# Patient Record
Sex: FEMALE | Race: WHITE | Employment: UNEMPLOYED | URBAN - METROPOLITAN AREA
[De-identification: names, ages, dates, MRNs, and addresses within clinical notes are randomized per-mention and may not be internally consistent; named-entity substitution may affect disease eponyms.]

---

## 2021-10-31 ENCOUNTER — HOSPITAL ENCOUNTER (EMERGENCY)
Facility: CLINIC | Age: 66
Discharge: HOME | End: 2021-10-31
Attending: EMERGENCY MEDICINE | Admitting: EMERGENCY MEDICINE
Payer: COMMERCIAL

## 2021-10-31 VITALS
DIASTOLIC BLOOD PRESSURE: 81 MMHG | OXYGEN SATURATION: 99 % | RESPIRATION RATE: 24 BRPM | HEIGHT: 64 IN | BODY MASS INDEX: 27.14 KG/M2 | SYSTOLIC BLOOD PRESSURE: 139 MMHG | TEMPERATURE: 96.5 F | HEART RATE: 77 BPM | WEIGHT: 159 LBS

## 2021-10-31 DIAGNOSIS — R51.9 NONINTRACTABLE HEADACHE, UNSPECIFIED CHRONICITY PATTERN, UNSPECIFIED HEADACHE TYPE: ICD-10-CM

## 2021-10-31 DIAGNOSIS — F43.0 ACUTE REACTION TO STRESS: ICD-10-CM

## 2021-10-31 PROCEDURE — 250N000013 HC RX MED GY IP 250 OP 250 PS 637: Performed by: EMERGENCY MEDICINE

## 2021-10-31 PROCEDURE — 99283 EMERGENCY DEPT VISIT LOW MDM: CPT

## 2021-10-31 RX ORDER — HYDROXYZINE HYDROCHLORIDE 25 MG/1
25 TABLET, FILM COATED ORAL ONCE
Status: COMPLETED | OUTPATIENT
Start: 2021-10-31 | End: 2021-10-31

## 2021-10-31 RX ORDER — ACETAMINOPHEN 500 MG
1000 TABLET ORAL ONCE
Status: COMPLETED | OUTPATIENT
Start: 2021-10-31 | End: 2021-10-31

## 2021-10-31 RX ADMIN — HYDROXYZINE HYDROCHLORIDE 25 MG: 25 TABLET ORAL at 20:49

## 2021-10-31 RX ADMIN — ACETAMINOPHEN 1000 MG: 500 TABLET, FILM COATED ORAL at 21:37

## 2021-10-31 ASSESSMENT — ENCOUNTER SYMPTOMS
NERVOUS/ANXIOUS: 1
TREMORS: 1
HEADACHES: 1
SHORTNESS OF BREATH: 0

## 2021-10-31 ASSESSMENT — MIFFLIN-ST. JEOR: SCORE: 1246.22

## 2021-11-01 NOTE — ED TRIAGE NOTES
"[facility]  ED Arrival Note    Arrived to ED via [facility]. A &O X4. ABC's intact. Reportedly pt was in a family members car and received bad news. Pt became very anxious and went into a panic attack. Family called 911. Upon EMS arrival pt c/o headache and \"heart\".  On arrival pt is shaky and very tearful but able to answer simple questions.    Visitors during triage: None    Triage Interventions: None    Ambulatory: Yes    Meets Stroke Criteria?: No    Meets Trauma Criteria?: No    Directed to: MH/BH    "

## 2021-11-01 NOTE — ED NOTES
Bed: ED16  Expected date:   Expected time:   Means of arrival:   Comments:  437  66F Depression  2017

## 2021-11-01 NOTE — ED PROVIDER NOTES
"  History   Chief Complaint:  Psychiatric Evaluation     The history is provided by the patient and the EMS personnel.      Celeste Cristina is a 66 year old female who presents for psychiatric evaluation. The patient reports that she was driving just prior to arrival when she received a call that her nephew may pass away soon due to COVID. This caused her to become panicked with a headache and tremors, so she pulled over and called EMS. She has had similar headaches in the past but denies any history of panic attacks or anxiety attacks. She denies chest pain or shortness of breath. She denies alcohol or drug use and has no suicidal or homicidal ideations.     Review of Systems   Respiratory: Negative for shortness of breath.    Cardiovascular: Negative for chest pain.   Neurological: Positive for tremors and headaches.   Psychiatric/Behavioral: The patient is nervous/anxious.    All other systems reviewed and are negative.      Allergies:  No Known Allergies    Medications:  Lexapro  Trazodone    Past Medical History:     The patient denies past medical history.      Past Surgical History:    The patient denies past surgical history.     Family History:    Noncontributory    Social History:  Presents to ED alone via EMS.     Physical Exam     Patient Vitals for the past 24 hrs:   BP Temp Temp src Pulse Resp SpO2 Height Weight   10/31/21 2030 -- -- -- 88 24 -- -- --   10/31/21 2021 (!) 158/98 (!) 96.5  F (35.8  C) Temporal -- -- 94 % 1.626 m (5' 4\") 72.1 kg (159 lb)       Physical Exam  General: Alert and cooperative with exam. Patient in mild distress. Normal mentation. Very anxious in appearance.  Head:  Scalp is NC/AT  Eyes:  No scleral icterus, PERRL  ENT:  The external nose and ears are normal.  Neck:  Normal range of motion without rigidity.  CV:  Regular rate and rhythm    No pathologic murmur   Resp:  Breath sounds are clear bilaterally    Non-labored, no retractions or accessory muscle use  GI:  Abdomen " is soft, no distension, no tenderness. No peritoneal signs  MS:  No lower extremity edema   Skin:  Warm and dry, No rash or lesions noted.  Neuro: Oriented x 3. No gross motor deficits. Tremulous to all extremities (distractible).     Emergency Department Course     Emergency Department Course:  Reviewed:  I reviewed nursing notes, vitals and past medical history.    Assessments:  2023 I obtained history and examined the patient as noted above.     2131 I rechecked the patient and explained findings.     Interventions:  2049 Atarax 25 mg, PO  2137 Tylenol 1000 mg, PO    Disposition:  The patient was discharged to home.     Impression & Plan     Medical Decision Making:  Celeste Cristina is a 66 year old female who presents for evaluation of panic attack in reaction to stress. Patient feels improved after interventions as noted above in the Emergency Department.  There are no signs at this point of a general medical problem causing anxiety (PE, hyperthyroidism, other metabolic derangement, infection, etc).  There are no signs of serious decompensation warranting psychiatric hospitalization or 72 hold (no suicidal or homicidal ideation, no danger to self). Supportive outpatient management is therefore indicated.      Diagnosis:    ICD-10-CM    1. Acute reaction to stress  F43.0    2. Nonintractable headache, unspecified chronicity pattern, unspecified headache type  R51.9      Scribe Disclosure:  I, Caitlin Garcia, am serving as a scribe at 8:28 PM on 10/31/2021 to document services personally performed by Abdon Patino DO based on my observations and the provider's statements to me.     Abdon Patino DO  11/02/21 1344

## 2023-02-23 ENCOUNTER — TELEPHONE (OUTPATIENT)
Dept: PULMONOLOGY | Facility: CLINIC | Age: 68
End: 2023-02-23
Payer: COMMERCIAL

## 2023-02-23 DIAGNOSIS — R05.9 COUGH: Primary | ICD-10-CM

## 2023-02-23 NOTE — TELEPHONE ENCOUNTER
Pt left message requesting a new pt appt with Dr Guaman, who sees her brother.  Provided scheduling number.

## 2023-02-25 ENCOUNTER — TELEPHONE (OUTPATIENT)
Dept: PULMONOLOGY | Facility: CLINIC | Age: 68
End: 2023-02-25
Payer: COMMERCIAL

## 2023-02-25 NOTE — TELEPHONE ENCOUNTER
lvm via  line for the patient to call back and schedule the following:    Appointment type: cxr  Provider: Deniz  Return date: 4/24/23  Specialty phone number: [phone]  Additional appointment(s) needed: n/a  Additonal Notes: called pt twice first call pt answered and disconnected while  was speaking, next call no answer  lvm to to scheduled cxr via call center

## 2023-03-09 NOTE — TELEPHONE ENCOUNTER
RECORDS RECEIVED FROM: internal    DATE RECEIVED: 4.24.23    NOTES STATUS DETAILS   OFFICE NOTE from referring provider internal  Self referred    OFFICE NOTE from other specialist     DISCHARGE SUMMARY from hospital     DISCHARGE REPORT from the ER     MEDICATION LIST internal     IMAGING  (NEED IMAGES AND REPORTS)     CT SCAN     CHEST XRAY (CXR) In process  Order placed- needing to be scheduled    TESTS     PULMONARY FUNCTION TESTING (PFT) internal  Scheduled 4.19.23        Action 3.9.23 sv    Action Taken Message sent to CC pool to help schedule CXR

## 2023-03-10 ENCOUNTER — TELEPHONE (OUTPATIENT)
Dept: PULMONOLOGY | Facility: CLINIC | Age: 68
End: 2023-03-10
Payer: COMMERCIAL

## 2023-03-10 NOTE — TELEPHONE ENCOUNTER
Called pt (1st attempt) for the patient to call back and schedule the following:    Appointment type: CXR  Provider: RADHA  Return date: 4/24/23  Specialty phone number: [phone]  Additional appointment(s) needed: NA  Additonal Notes: called patient via [employer] . Patient's phone didn't ever go to .

## 2023-03-17 NOTE — TELEPHONE ENCOUNTER
Left Voicemail (2nd Attempt) for the patient to call back and schedule the following:    Appointment type: CXR  Provider: RADHA  Return date: 4/24/23  Specialty phone number: [phone]  Additional appointment(s) needed: NA  Additonal Notes: LVM via [facility] .

## 2023-03-29 ENCOUNTER — TELEPHONE (OUTPATIENT)
Dept: PULMONOLOGY | Facility: CLINIC | Age: 68
End: 2023-03-29
Payer: COMMERCIAL

## 2023-03-29 NOTE — TELEPHONE ENCOUNTER
Patient Contacted for the patient to call back and schedule the following:    Appointment type: CXR  Provider: Deniz  Return date: 4/24/23  Specialty phone number: [phone]  Additional appointment(s) needed: na  Additonal Notes: 3/29/23 - Scheduled CXR, prior to visit with provider on 4/24/23. Will send printed itinerary. [facility]

## 2023-04-03 ENCOUNTER — TELEPHONE (OUTPATIENT)
Dept: PULMONOLOGY | Facility: CLINIC | Age: 68
End: 2023-04-03
Payer: COMMERCIAL

## 2023-04-03 NOTE — TELEPHONE ENCOUNTER
Pt states genetic pre disposition to breast ca.  She is concerned about radiation associated with CXR and is requesting an MRI.  Writer explained that the amount of radiation received is very low and that an MRI would likely not be covered without a qualifying dx, however, pt would like MD opinion.

## 2023-04-03 NOTE — TELEPHONE ENCOUNTER
[facility] Call Center    Phone Message    May a detailed message be left on voicemail: yes     Reason for Call: Other: Per pt would like to know if she can have a MRI instead of a CT CHEST Xray due to radiation. Please call pt back to discuss. Thank you!     Action Taken: Message routed to:  [facility] PULM    Travel Screening: Not Applicable

## 2023-04-04 NOTE — TELEPHONE ENCOUNTER
I agree with what you said. C xray is minimal radiation. Mri is no indicated at this time.     Thanks,   Hem     Pt verbalized understanding and agreed to proceed with CXR prior to appt.  Pt asked that we remove need for  from her chart.

## 2023-04-12 DIAGNOSIS — R05.9 COUGH: ICD-10-CM

## 2023-04-12 PROCEDURE — 94726 PLETHYSMOGRAPHY LUNG VOLUMES: CPT | Performed by: INTERNAL MEDICINE

## 2023-04-12 PROCEDURE — 94729 DIFFUSING CAPACITY: CPT | Performed by: INTERNAL MEDICINE

## 2023-04-12 PROCEDURE — 94375 RESPIRATORY FLOW VOLUME LOOP: CPT | Performed by: INTERNAL MEDICINE

## 2023-04-14 LAB
DLCOUNC-%PRED-PRE: 90 %
DLCOUNC-PRE: 17.89 ML/MIN/MMHG
DLCOUNC-PRED: 19.68 ML/MIN/MMHG
ERV-%PRED-PRE: 74 %
ERV-PRE: 0.47 L
ERV-PRED: 0.63 L
EXPTIME-PRE: 8.05 SEC
FEF2575-%PRED-PRE: 112 %
FEF2575-PRE: 2.12 L/SEC
FEF2575-PRED: 1.88 L/SEC
FEFMAX-%PRED-PRE: 115 %
FEFMAX-PRE: 6.8 L/SEC
FEFMAX-PRED: 5.86 L/SEC
FEV1-%PRED-PRE: 92 %
FEV1-PRE: 1.98 L
FEV1FEV6-PRE: 83 %
FEV1FEV6-PRED: 80 %
FEV1FVC-PRE: 82 %
FEV1FVC-PRED: 79 %
FEV1SVC-PRE: 79 %
FEV1SVC-PRED: 68 %
FIFMAX-PRE: 5.21 L/SEC
FRCPLETH-%PRED-PRE: 97 %
FRCPLETH-PRE: 2.65 L
FRCPLETH-PRED: 2.71 L
FVC-%PRED-PRE: 88 %
FVC-PRE: 2.4 L
FVC-PRED: 2.71 L
IC-%PRED-PRE: 81 %
IC-PRE: 2.04 L
IC-PRED: 2.5 L
RVPLETH-%PRED-PRE: 108 %
RVPLETH-PRE: 2.18 L
RVPLETH-PRED: 2.01 L
TLCPLETH-%PRED-PRE: 94 %
TLCPLETH-PRE: 4.68 L
TLCPLETH-PRED: 4.94 L
VA-%PRED-PRE: 88 %
VA-PRE: 4.2 L
VC-%PRED-PRE: 80 %
VC-PRE: 2.51 L
VC-PRED: 3.12 L

## 2023-04-24 ENCOUNTER — ANCILLARY PROCEDURE (OUTPATIENT)
Dept: GENERAL RADIOLOGY | Facility: CLINIC | Age: 68
End: 2023-04-24
Payer: COMMERCIAL

## 2023-04-24 ENCOUNTER — PRE VISIT (OUTPATIENT)
Dept: PULMONOLOGY | Facility: CLINIC | Age: 68
End: 2023-04-24
Payer: COMMERCIAL

## 2023-04-24 ENCOUNTER — OFFICE VISIT (OUTPATIENT)
Dept: PULMONOLOGY | Facility: CLINIC | Age: 68
End: 2023-04-24
Payer: COMMERCIAL

## 2023-04-24 VITALS
HEIGHT: 64 IN | BODY MASS INDEX: 26.46 KG/M2 | HEART RATE: 71 BPM | WEIGHT: 155 LBS | SYSTOLIC BLOOD PRESSURE: 121 MMHG | RESPIRATION RATE: 17 BRPM | DIASTOLIC BLOOD PRESSURE: 74 MMHG | OXYGEN SATURATION: 98 %

## 2023-04-24 DIAGNOSIS — R91.8 PULMONARY NODULES: Primary | ICD-10-CM

## 2023-04-24 DIAGNOSIS — R05.9 COUGH: ICD-10-CM

## 2023-04-24 PROCEDURE — G0463 HOSPITAL OUTPT CLINIC VISIT: HCPCS

## 2023-04-24 PROCEDURE — 99204 OFFICE O/P NEW MOD 45 MIN: CPT

## 2023-04-24 PROCEDURE — 71046 X-RAY EXAM CHEST 2 VIEWS: CPT | Performed by: RADIOLOGY

## 2023-04-24 ASSESSMENT — PAIN SCALES - GENERAL: PAINLEVEL: NO PAIN (0)

## 2023-04-24 NOTE — PATIENT INSTRUCTIONS
"On today's Chest X ray, there are 2 nodules that are noted.  One in the Right lower lung (a nodule of calcium was present in this area on a CT scan back in 2020) and one in the left upper lung.  The one in the left upper lung is 11 mm in diameter.     Nodule is a non-specific term and can represent a lot of different abnormalities in the lung.  It could represent something like a localized bit of scarring (which is not concerning), but it could also represent other types of lung disease such as cancer.      A chest CT scan is the best test we have to look at these size of nodules.      I am going to start by trying to get old Chest X ray images that were done in years past and compare them to today's Chest X ray.  If both of the nodules were present years ago, then the nodules would be considered \"benign\" and no further work up is necessary.  If the nodules are new, then we will need to likely get a chest CT scan to look at them more carefully.      I will call you after I review the old images.   "

## 2023-04-24 NOTE — LETTER
4/24/2023         RE: Mary Cristina  [address]  [place] [address]        Dear Colleague,    Thank you for referring your patient, Mary Crsitina, to the [facility] [place]. Please see a copy of my visit note below.    [facility]  Pulmonary Medicine  Visit Clinic Note  April 24, 2023         ASSESSMENT & PLAN       Pulmonary nodules    She made this appointment today because she had an abnormal imaging finding noted on an abdominal CT scan back in 2020.  This is a right lower lobe calcified nodule.  A chest x-ray today however shows both a right lower lobe nodule as well as a left upper lobe nodule.  Left upper lung lobe nodule is about 1 cm in diameter, but it is oblong.  I suggested that she get a chest CT scan to better characterize this, however she is extremely hesitant to move forward with this.  She has been diagnosed with a gene mutation, which elevates her risk for breast cancer.  Therefore she is trying to avoid any radiation to her breast tissue.  Because of this, I will try to get her old images to review.  She has a chest x-ray in 2016 as well as 1 in 2019.  Reports do not comment on the left upper lobe nodule, however I will review them myself to see if there is any chance that this nodule was present back then.  If so, there is no need for further imaging and these nodules are likely benign.  If in fact this left upper lobe nodule is new, then I will recommend that she get a chest CT scan to characterize this.        I will call her after I get the results of the chest x-ray images uploaded into PACS for my review.      Chacorta Guaman MD     I spent 45 minutes on the date of the encounter doing chart review, history and exam, documentation and discussion of pulmonary nodules.    Addendum:  CXR from 2016 and 2019 obtained.  LEILA nodule present then.  No need for further imaging.  Benign nodules, likely from  "previous infection.         Today's visit note:     Chief Complaint: Consult (New Cough)      HISTORY OF PRESENT ILLNESS:    Mary Cristina is a 67 year old year old female who is being seen for              Past Medical and Surgical History:     Past Medical History:   Diagnosis Date    Depression     Gastroesophageal reflux disease without esophagitis     High cholesterol     Osteopenia      History reviewed. No pertinent surgical history.        Family History:     Family History   Problem Relation Age of Onset    Pulmonary fibrosis Brother               Social History:     Social History     Socioeconomic History    Marital status:      Spouse name: Not on file    Number of children: Not on file    Years of education: Not on file    Highest education level: Not on file   Occupational History    Not on file   Tobacco Use    Smoking status: Never    Smokeless tobacco: Never   Vaping Use    Vaping status: Not on file   Substance and Sexual Activity    Alcohol use: Never    Drug use: Never    Sexual activity: Not on file   Other Topics Concern    Not on file   Social History Narrative    Not on file     Social Determinants of Health     Financial Resource Strain: Not on file   Food Insecurity: Not on file   Transportation Needs: Not on file   Physical Activity: Not on file   Stress: Not on file   Social Connections: Not on file   Intimate Partner Violence: Not on file   Housing Stability: Not on file            Medications:     No current outpatient medications on file.     No current facility-administered medications for this visit.            Review of Systems:       A complete review of systems was otherwise negative except as noted in the HPI.      PHYSICAL EXAM:  /74   Pulse 71   Resp 17   Ht 1.626 m (5' 4\")   Wt 70.3 kg (155 lb)   SpO2 98%   BMI 26.61 kg/m       General: Comfortable, No apparent distress  Eyes: Anicteric  Ears: Hearing grossly normal  Mouth: Oral mucosa is moist, without " any lesions. No oropharyngeal exudate.  Lymphatics: No cervical or supraclavicular nodes  Respiratory: Good air movement. No crackles. No rhonchi. No wheezes  Cardiac: RRR, normal S1, S2. No murmurs. No JVD  Musculoskeletal: Extremities normal. No clubbing. No cyanosis. No edema.  Skin: No rash on limited exam  Neuro: Normal mentation. Normal speech.  Psych:Normal affect           Data:   All laboratory and imaging data reviewed.      PFT:         PFT Interpretation:  No airflow obstruction.  Normal lung volume. Normal diffusion capacity  Valid Maneuver    CXR: I have reviewed the CXR images from April 24, 2023 and agree with the radiologist interpretation below:  FINDINGS: PA and lateral radiographs of the chest. Trachea is midline.  The cardiac silhouette is not enlarged. No visualized pleural effusion  or pneumothorax. Lung apices are partially obscured by chin and  patient positioning. 11 mm nodule in the anterior left upper lobe.  Additional 4 mm nodule in the lateral right lower lung. Probable small  calcified mediastinal lymph nodes. Visualized upper abdomen is  unremarkable.                                                                      IMPRESSION: 11 mm nodularity in the anterior left upper lobe and 4 mm  nodule in the lateral right lower lung, probably sequela of prior  granulomatous disease, though recommend further evaluation with chest  CT.    I reviewed the chest x-ray reports from 2016, 2019, and the lung window reports from an abdominal CT scan in 2020.  The abdominal CT scan describes a right lower lobe calcified nodule, which could be the right lower lobe nodule noted on the chest x-ray today.  There is no description of the left upper lobe nodule on previous imaging.      No results found for this or any previous visit (from the past 168 hour(s)).                                          Again, thank you for allowing me to participate in the care of your patient.         Sincerely,        Pa Guaman MD

## 2023-04-24 NOTE — PROGRESS NOTES
[facility]  Pulmonary Medicine  Visit Clinic Note  April 24, 2023         ASSESSMENT & PLAN       Pulmonary nodules    She made this appointment today because she had an abnormal imaging finding noted on an abdominal CT scan back in 2020.  This is a right lower lobe calcified nodule.  A chest x-ray today however shows both a right lower lobe nodule as well as a left upper lobe nodule.  Left upper lung lobe nodule is about 1 cm in diameter, but it is oblong.  I suggested that she get a chest CT scan to better characterize this, however she is extremely hesitant to move forward with this.  She has been diagnosed with a gene mutation, which elevates her risk for breast cancer.  Therefore she is trying to avoid any radiation to her breast tissue.  Because of this, I will try to get her old images to review.  She has a chest x-ray in 2016 as well as 1 in 2019.  Reports do not comment on the left upper lobe nodule, however I will review them myself to see if there is any chance that this nodule was present back then.  If so, there is no need for further imaging and these nodules are likely benign.  If in fact this left upper lobe nodule is new, then I will recommend that she get a chest CT scan to characterize this.        I will call her after I get the results of the chest x-ray images uploaded into PACS for my review.      Chacorta Guaman MD     I spent 45 minutes on the date of the encounter doing chart review, history and exam, documentation and discussion of pulmonary nodules.    Addendum:  CXR from 2016 and 2019 obtained.  LEILA nodule present then.  No need for further imaging.  Benign nodules, likely from previous infection.         Today's visit note:     Chief Complaint: Consult (New Cough)      HISTORY OF PRESENT ILLNESS:    Mary Cristina is a 67 year old year old female who is being seen for              Past Medical and Surgical History:     Past Medical History:   Diagnosis Date      "Depression      Gastroesophageal reflux disease without esophagitis      High cholesterol      Osteopenia      History reviewed. No pertinent surgical history.        Family History:     Family History   Problem Relation Age of Onset     Pulmonary fibrosis Brother               Social History:     Social History     Socioeconomic History     Marital status:      Spouse name: Not on file     Number of children: Not on file     Years of education: Not on file     Highest education level: Not on file   Occupational History     Not on file   Tobacco Use     Smoking status: Never     Smokeless tobacco: Never   Vaping Use     Vaping status: Not on file   Substance and Sexual Activity     Alcohol use: Never     Drug use: Never     Sexual activity: Not on file   Other Topics Concern     Not on file   Social History Narrative     Not on file     Social Determinants of Health     Financial Resource Strain: Not on file   Food Insecurity: Not on file   Transportation Needs: Not on file   Physical Activity: Not on file   Stress: Not on file   Social Connections: Not on file   Intimate Partner Violence: Not on file   Housing Stability: Not on file            Medications:     No current outpatient medications on file.     No current facility-administered medications for this visit.            Review of Systems:       A complete review of systems was otherwise negative except as noted in the HPI.      PHYSICAL EXAM:  /74   Pulse 71   Resp 17   Ht 1.626 m (5' 4\")   Wt 70.3 kg (155 lb)   SpO2 98%   BMI 26.61 kg/m       General: Comfortable, No apparent distress  Eyes: Anicteric  Ears: Hearing grossly normal  Mouth: Oral mucosa is moist, without any lesions. No oropharyngeal exudate.  Lymphatics: No cervical or supraclavicular nodes  Respiratory: Good air movement. No crackles. No rhonchi. No wheezes  Cardiac: RRR, normal S1, S2. No murmurs. No JVD  Musculoskeletal: Extremities normal. No clubbing. No cyanosis. No " edema.  Skin: No rash on limited exam  Neuro: Normal mentation. Normal speech.  Psych:Normal affect           Data:   All laboratory and imaging data reviewed.      PFT:         PFT Interpretation:  No airflow obstruction.  Normal lung volume. Normal diffusion capacity  Valid Maneuver    CXR: I have reviewed the CXR images from April 24, 2023 and agree with the radiologist interpretation below:  FINDINGS: PA and lateral radiographs of the chest. Trachea is midline.  The cardiac silhouette is not enlarged. No visualized pleural effusion  or pneumothorax. Lung apices are partially obscured by chin and  patient positioning. 11 mm nodule in the anterior left upper lobe.  Additional 4 mm nodule in the lateral right lower lung. Probable small  calcified mediastinal lymph nodes. Visualized upper abdomen is  unremarkable.                                                                      IMPRESSION: 11 mm nodularity in the anterior left upper lobe and 4 mm  nodule in the lateral right lower lung, probably sequela of prior  granulomatous disease, though recommend further evaluation with chest  CT.    I reviewed the chest x-ray reports from 2016, 2019, and the lung window reports from an abdominal CT scan in 2020.  The abdominal CT scan describes a right lower lobe calcified nodule, which could be the right lower lobe nodule noted on the chest x-ray today.  There is no description of the left upper lobe nodule on previous imaging.      No results found for this or any previous visit (from the past 168 hour(s)).

## 2025-06-02 ENCOUNTER — TELEPHONE (OUTPATIENT)
Dept: OPHTHALMOLOGY | Facility: CLINIC | Age: 70
End: 2025-06-02
Payer: COMMERCIAL

## 2025-06-02 NOTE — TELEPHONE ENCOUNTER
[facility] Call Center    Phone Message    May a detailed message be left on voicemail: yes     Reason for Call: Other: Patient called stating that the pt had spoken with Dr. Rollins a week ago, and needs an appointment for Macular Degeneration before a laser appointment that is at the [facility] on 6/20/2025. The patient did not want to take the first available that is 7/8/2025 with Dr. Rollins as stated that would be too late. Please call patient back. Thank you.      Action Taken: Other: [facility] Ophthalmology    Travel Screening: Not Applicable     Date of Service:

## 2025-06-02 NOTE — TELEPHONE ENCOUNTER
Pt called back and LVM that she wishes to cancel the appt with Dr. Vargas Schaefer and wait to be seen with Dr. CASTELLANOS. I called her back and she was already scheduled with Dr. CASTELLANOS for 7/8. She was placed on the wait list in case of a cancellation with Dr. CASTELLANOS.    Melissa Fuentes, COA 2:48 PM June 2, 2025

## 2025-06-02 NOTE — TELEPHONE ENCOUNTER
Spoke with pt today regarding a possible appt prior to her [facility] laser procedure on 6/20. When I spoke with her today, I inquired what type of laser she is having done. She states she is set for a PDT laser.    I let her know that Dr. CASTELLANOS's schedule is booked out with the first available on 7/8. I let the pt know that we could schedule with another provider here in the clinic on a day when Dr. CASTELLANOS is also in clinic. The other option would be for pt to possibly be seen by Dr. CASTELLANOS in [place] ([facility]) where he is on Wednesday's or schedule with him on 7/8 and place her on the wait list.    Pt opted to schedule next Monday with Dr. Vargas Schaefer at 8:00 AM.    RADHA Calderon 1:11 PM June 2, 2025

## 2025-06-09 ENCOUNTER — TELEPHONE (OUTPATIENT)
Dept: PULMONOLOGY | Facility: CLINIC | Age: 70
End: 2025-06-09
Payer: COMMERCIAL

## 2025-06-09 NOTE — TELEPHONE ENCOUNTER
Patient LVM on CF line requesting to schedule appointment with Dr. Smith. Patient states she does not need an . Routed to scheduling team.

## 2025-06-09 NOTE — TELEPHONE ENCOUNTER
Left Voicemail (1st Attempt) for the patient to call back and schedule the following:    Appointment type: RTN  Provider: RADHA  Return date: NEXT AVAIL  Specialty phone number: [phone]  Additional appointment(s) needed: NA  Additonal Notes: NA

## 2025-06-09 NOTE — TELEPHONE ENCOUNTER
Pt called wanting to make an appt with Dr. Guaman.     Adelia CASTELLANOS, RN  Pulmonary Nurse Care Coordinator

## 2025-06-10 ENCOUNTER — TELEPHONE (OUTPATIENT)
Dept: PULMONOLOGY | Facility: CLINIC | Age: 70
End: 2025-06-10
Payer: COMMERCIAL

## 2025-06-10 NOTE — TELEPHONE ENCOUNTER
"[facility] Call Center    Phone Message    May a detailed message be left on voicemail: yes     Reason for Call: Pt recently had chest x-ray done 6/5/25 through [facility] and is concerned about the portion that indicates \"Interstitial lung opacities have increased in size which may indicate progression of fibrotic change\". Her provider has since ordered a chest CT for her, which she is scheduled to have done on 6/12/25.    Pt is currently scheduled to meet with Dr. Guaman 7/21/25, but is extremely anxious about these findings and wondering if he could get her in for an earlier appt? Either way, she would appreciate it if he could review her chest x-ray and then her CT when it becomes available.     Action Taken: Other: Pulm    Travel Screening: Not Applicable   "

## 2025-06-10 NOTE — TELEPHONE ENCOUNTER
Patient Contacted for the patient to call back and schedule the following:    Appointment type: RTN  Provider: RADHA  Return date: NEXT AVAIL  Specialty phone number: [phone]  Additional appointment(s) needed: NA  Additonal Notes: NA

## 2025-06-13 NOTE — TELEPHONE ENCOUNTER
RN called and spoke with the pt. RN let her know that we are working on getting both the cxr and CT in our system and then will send Dr. Guaman a message. Pt is worried about CXR findings. Pt reports breathing is doing good. She does sometimes (once or twice a day) gets pain in the back of the lungs but then it goes away.     Adelia CASTELLANOS RN  Pulmonary Nurse Care Coordinator

## 2025-06-13 NOTE — TELEPHONE ENCOUNTER
Patent call back for update on results info and getting a sooner appointment. Please advise. Thank you

## 2025-06-16 NOTE — TELEPHONE ENCOUNTER
RN called to relay that Dr. Guaman would like to discuss this at her appt. Pt is worried since one of her brothers passed away due to fibrosis of the lungs. RN reassured that there is nothing to do at this time.    Adelia CASTELLANOS RN  Pulmonary Nurse Care Coordinator

## 2025-07-01 DIAGNOSIS — H35.30 ARMD (AGE-RELATED MACULAR DEGENERATION), BILATERAL: Primary | ICD-10-CM

## 2025-07-21 ENCOUNTER — LAB (OUTPATIENT)
Dept: LAB | Facility: CLINIC | Age: 70
End: 2025-07-21
Payer: COMMERCIAL

## 2025-07-21 ENCOUNTER — OFFICE VISIT (OUTPATIENT)
Dept: PULMONOLOGY | Facility: CLINIC | Age: 70
End: 2025-07-21
Payer: COMMERCIAL

## 2025-07-21 VITALS
OXYGEN SATURATION: 98 % | WEIGHT: 164 LBS | SYSTOLIC BLOOD PRESSURE: 111 MMHG | HEART RATE: 61 BPM | DIASTOLIC BLOOD PRESSURE: 68 MMHG | HEIGHT: 64 IN | BODY MASS INDEX: 28 KG/M2

## 2025-07-21 DIAGNOSIS — J84.9 ILD (INTERSTITIAL LUNG DISEASE) (H): ICD-10-CM

## 2025-07-21 DIAGNOSIS — J84.9 ILD (INTERSTITIAL LUNG DISEASE) (H): Primary | ICD-10-CM

## 2025-07-21 DIAGNOSIS — J92.9 PLEURAL PLAQUE: ICD-10-CM

## 2025-07-21 LAB
BASOPHILS # BLD AUTO: 0 10E3/UL (ref 0–0.2)
BASOPHILS NFR BLD AUTO: 0 %
DLCOCOR-%PRED-PRE: 93 %
DLCOCOR-PRE: 17.53 ML/MIN/MMHG
DLCOUNC-%PRED-PRE: 94 %
DLCOUNC-PRE: 17.78 ML/MIN/MMHG
DLCOUNC-PRED: 18.74 ML/MIN/MMHG
EOSINOPHIL # BLD AUTO: 0.1 10E3/UL (ref 0–0.7)
EOSINOPHIL NFR BLD AUTO: 2 %
ERV-%PRED-PRE: 79 %
ERV-PRE: 0.56 L
ERV-PRED: 0.71 L
ERYTHROCYTE [DISTWIDTH] IN BLOOD BY AUTOMATED COUNT: 14.6 % (ref 10–15)
ERYTHROCYTE [SEDIMENTATION RATE] IN BLOOD BY WESTERGREN METHOD: 16 MM/HR (ref 0–30)
EXPTIME-PRE: 9.55 SEC
FEF2575-%PRED-PRE: 80 %
FEF2575-PRE: 1.41 L/SEC
FEF2575-PRED: 1.75 L/SEC
FEFMAX-%PRED-PRE: 129 %
FEFMAX-PRE: 7.2 L/SEC
FEFMAX-PRED: 5.56 L/SEC
FEV1-%PRED-PRE: 90 %
FEV1-PRE: 1.9 L
FEV1FEV6-PRE: 80 %
FEV1FEV6-PRED: 79 %
FEV1FVC-PRE: 76 %
FEV1FVC-PRED: 79 %
FEV1SVC-PRE: 81 L
FEV1SVC-PRED: 71 L
FIFMAX-PRE: 4.98 L/SEC
FRCPLETH-%PRED-PRE: 98 %
FRCPLETH-PRE: 2.69 L
FRCPLETH-PRED: 2.72 L
FVC-%PRED-PRE: 92 %
FVC-PRE: 2.5 L
FVC-PRED: 2.69 L
GAW-PRED: 1.03 L/S/CMH2O
HCT VFR BLD AUTO: 35.8 % (ref 35–47)
HGB BLD-MCNC: 11.7 G/DL (ref 11.7–15.7)
IC-%PRED-PRE: 81 %
IC-PRE: 1.79 L
IC-PRED: 2.18 L
IMM GRANULOCYTES # BLD: 0 10E3/UL
IMM GRANULOCYTES NFR BLD: 0 %
LYMPHOCYTES # BLD AUTO: 2 10E3/UL (ref 0.8–5.3)
LYMPHOCYTES NFR BLD AUTO: 39 %
Lab: 96 %
MCH RBC QN AUTO: 27.7 PG (ref 26.5–33)
MCHC RBC AUTO-ENTMCNC: 32.7 G/DL (ref 31.5–36.5)
MCV RBC AUTO: 85 FL (ref 78–100)
MONOCYTES # BLD AUTO: 0.5 10E3/UL (ref 0–1.3)
MONOCYTES NFR BLD AUTO: 9 %
NEUTROPHILS # BLD AUTO: 2.4 10E3/UL (ref 1.6–8.3)
NEUTROPHILS NFR BLD AUTO: 49 %
PLATELET # BLD AUTO: 209 10E3/UL (ref 150–450)
RBC # BLD AUTO: 4.22 10E6/UL (ref 3.8–5.2)
RVPLETH-%PRED-PRE: 110 %
RVPLETH-PRE: 2.11 L
RVPLETH-PRED: 1.91 L
SGAW-PRED: 0.2 1/CMH2O*S
SRAW-PRED: < 4.76 CMH2O*S
TLCPLETH-%PRED-PRE: 91 %
TLCPLETH-PRE: 4.47 L
TLCPLETH-PRED: 4.9 L
VA-%PRED-PRE: 95 %
VA-PRE: 4.27 L
VC-%PRED-PRE: 79 %
VC-PRE: 2.36 L
VC-PRED: 2.95 L
WBC # BLD AUTO: 5 10E3/UL (ref 4–11)

## 2025-07-21 PROCEDURE — 86606 ASPERGILLUS ANTIBODY: CPT | Mod: 90

## 2025-07-21 PROCEDURE — 86235 NUCLEAR ANTIGEN ANTIBODY: CPT | Mod: 59

## 2025-07-21 PROCEDURE — 86200 CCP ANTIBODY: CPT

## 2025-07-21 PROCEDURE — 3078F DIAST BP <80 MM HG: CPT

## 2025-07-21 PROCEDURE — 86140 C-REACTIVE PROTEIN: CPT

## 2025-07-21 PROCEDURE — 86331 IMMUNODIFFUSION OUCHTERLONY: CPT | Mod: 90

## 2025-07-21 PROCEDURE — 94150 VITAL CAPACITY TEST: CPT | Performed by: INTERNAL MEDICINE

## 2025-07-21 PROCEDURE — 3074F SYST BP LT 130 MM HG: CPT

## 2025-07-21 PROCEDURE — 36415 COLL VENOUS BLD VENIPUNCTURE: CPT

## 2025-07-21 PROCEDURE — 86431 RHEUMATOID FACTOR QUANT: CPT

## 2025-07-21 PROCEDURE — 85652 RBC SED RATE AUTOMATED: CPT

## 2025-07-21 PROCEDURE — G0463 HOSPITAL OUTPT CLINIC VISIT: HCPCS

## 2025-07-21 PROCEDURE — 85025 COMPLETE CBC W/AUTO DIFF WBC: CPT

## 2025-07-21 PROCEDURE — 99417 PROLNG OP E/M EACH 15 MIN: CPT

## 2025-07-21 PROCEDURE — 94375 RESPIRATORY FLOW VOLUME LOOP: CPT | Performed by: INTERNAL MEDICINE

## 2025-07-21 PROCEDURE — 86038 ANTINUCLEAR ANTIBODIES: CPT

## 2025-07-21 PROCEDURE — 1125F AMNT PAIN NOTED PAIN PRSNT: CPT

## 2025-07-21 PROCEDURE — 99215 OFFICE O/P EST HI 40 MIN: CPT | Mod: 25

## 2025-07-21 PROCEDURE — 99000 SPECIMEN HANDLING OFFICE-LAB: CPT

## 2025-07-21 PROCEDURE — 80053 COMPREHEN METABOLIC PANEL: CPT

## 2025-07-21 PROCEDURE — 94729 DIFFUSING CAPACITY: CPT | Performed by: INTERNAL MEDICINE

## 2025-07-21 PROCEDURE — 94726 PLETHYSMOGRAPHY LUNG VOLUMES: CPT | Performed by: INTERNAL MEDICINE

## 2025-07-21 PROCEDURE — 86036 ANCA SCREEN EACH ANTIBODY: CPT

## 2025-07-21 RX ORDER — MULTIVITAMIN,THER AND MINERALS
1 TABLET ORAL
COMMUNITY

## 2025-07-21 RX ORDER — FAMOTIDINE 20 MG/1
20-40 TABLET, FILM COATED ORAL
COMMUNITY
Start: 2024-11-19

## 2025-07-21 RX ORDER — ATORVASTATIN CALCIUM 10 MG/1
10 TABLET, FILM COATED ORAL DAILY
COMMUNITY
Start: 2024-06-27

## 2025-07-21 RX ORDER — ESCITALOPRAM OXALATE 5 MG/1
5 TABLET ORAL DAILY
COMMUNITY
Start: 2024-11-03

## 2025-07-21 RX ORDER — MECOBALAMIN 5000 MCG
TABLET,DISINTEGRATING ORAL
COMMUNITY
Start: 2024-02-05

## 2025-07-21 ASSESSMENT — PAIN SCALES - GENERAL: PAINLEVEL_OUTOF10: MODERATE PAIN (5)

## 2025-07-21 NOTE — LETTER
7/21/2025      Mary Cristina  [address]  [place] [facility] [address]      Dear Colleague,    Thank you for referring your patient, Mary Cristina, to the [facility] LUN[facility]ENCE AND [facility]. Please see a copy of my visit note below.      [facility]UN[facility] SCIENCE AND [facility]  [address] [facility] [address]  Phone: [phone]  Fax: [phone]    Patient:  Mary Cristina, Date of birth 1955  Date of Visit:  07/21/2025  Referring Provider Pa Guaman      Assessment & Plan     Pulmonary Fibrosis  Pulmonary nodules  Calcified Pleural Plaques    Findings of reticular opacities mostly in the upper lobes were incidentally found on a chest CT scan that she got for more acute upper respiratory symptoms.  It is unclear how long these opacities have been present.  I do not have another chest CT scan to compare.  She had pulmonary function testing performed 2 years ago.  I will repeat those today to see if there has been any major changes.    We spent the clinic today today describing what pulmonary fibrosis is and what it looks like on a chest CT scan.  The fact that she has associated calcified pleural plaques suggest that asbestosis is the cause of her lung disease.  Interestingly, her brother has the same issue with fibrosis and calcified pleural plaques.  She has no known asbestos exposure that she can point to.    I have ordered a serologic workup for her interstitial lung disease.  She will get pulmonary function testing today, and then again in 6 months with a follow-up with me.  I let her know that she should get in touch with me if she develops any new respiratory symptoms, but now she is currently asymptomatic from a breathing perspective.    Medical Decision Making     I spent a total of 85 minutes on the day of the visit.   Time spent by me today doing chart review, history and exam, documentation  and further activities per the note       Pa Guaman MD                    Today's visit note:     Chief Complaint: RECHECK (Return Pulmonary )      HISTORY OF PRESENT ILLNESS:    Mary Cristina is a 70 year old year old female who is being seen for abnormal chest CT scan findings.    I had previously seen her back in .  She had pulmonary nodules noted on chest x-rays and an abdominal CT scan.  The nodules were small, and after reviewing previous x-ray images at that they were stable.  I had recommended a chest CT scan just to get a better view of her lung parenchyma and see if there are any other nodules present.  She had a lot of concerns about getting radiation for the chest CT scan and so she declined.  She was to follow-up as needed in the future.  About 3 months ago, she had back to back illnesses which sounds like viral respiratory illnesses where she was coughing up a lot of mucus on a regular basis.  The symptoms have since subsided, but she did get a chest CT scan in  to evaluate these symptoms and it was noted that she had upper lobe predominant peripheral reticular opacities and calcified pleural plaques.  She made an appointment with me to discuss these findings.    Since her last visit with me, in general she has not had significant shortness of breath or cough, with the exception of the last 3 months.    She does have chronic joint pains of her right elbow, bilateral hands, left knee and back.  She also has a new skin rash that she is getting evaluated by dermatology.  It is a small raised diffuse itchy rash, and she was prescribed some ointment for this.  She does not think it is getting that much better.    She denies any known asbestos exposure.  She is concerned about her lungs because she did have 1 brother that  from pulmonary fibrosis, and she has another brother who I treat currently for pulmonary fibrosis.         Past Medical and Surgical History:     Past Medical  "History:   Diagnosis Date     Depression      Gastroesophageal reflux disease without esophagitis      High cholesterol      Macular degeneration (senile) of retina      Osteopenia      No past surgical history on file.        Family History:     Family History   Problem Relation Age of Onset     Pulmonary fibrosis Brother               Social History:     Social History     Socioeconomic History     Marital status:      Spouse name: Not on file     Number of children: Not on file     Years of education: Not on file     Highest education level: Not on file   Occupational History     Not on file   Tobacco Use     Smoking status: Never     Smokeless tobacco: Never   Substance and Sexual Activity     Alcohol use: Never     Drug use: Never     Sexual activity: Not on file   Other Topics Concern     Not on file   Social History Narrative     Not on file     Social Drivers of Health     Financial Resource Strain: Not on file   Food Insecurity: Not on file   Transportation Needs: Not on file   Physical Activity: Not on file   Stress: Not on file   Social Connections: Not on file   Interpersonal Safety: Not on file   Housing Stability: Not on file            Medications:     Current Outpatient Medications   Medication Sig Dispense Refill     atorvastatin (LIPITOR) 10 MG tablet Take 10 mg by mouth daily.       escitalopram (LEXAPRO) 5 MG tablet Take 5 mg by mouth daily.       famotidine (PEPCID) 20 MG tablet Take 20-40 mg by mouth.       LANsoprazole (PREVACID) 15 MG DR capsule TAKE 1 CAPSULE BY MOUTH TWICE A DAY BEFORE MEALS       Multiple Vitamins-Minerals (SUPER THERA JAMIN M) TABS Take 1 tablet by mouth.       No current facility-administered medications for this visit.            Review of Systems:       A complete review of systems was otherwise negative except as noted in the HPI.      PHYSICAL EXAM:  /68   Pulse 61   Ht 1.626 m (5' 4\")   Wt 74.4 kg (164 lb)   SpO2 98%   BMI 28.15 kg/m       General: " Comfortable, No apparent distress  Eyes: Anicteric  Ears: Hearing grossly normal  Mouth: Oral mucosa is moist, without any lesions. No oropharyngeal exudate.  Lymphatics: No cervical or supraclavicular nodes  Respiratory: Good air movement. No crackles. No rhonchi. No wheezes  Cardiac: RRR, normal S1, S2. No murmurs.   Musculoskeletal:  No clubbing. No cyanosis.  Lymphedema present bilateral ankles.  Skin: No rash on limited exam  Neuro: Normal mentation. Normal speech.  Psych:Normal affect           Data:   All laboratory and imaging data reviewed.      PFT:         PFT Interpretation:  No airflow obstruction.  Normal lung volume. Normal diffusion capacity  Valid Maneuver    CXR: I have reviewed the CXR images from April 24, 2023 and agree with the radiologist interpretation below:  FINDINGS: PA and lateral radiographs of the chest. Trachea is midline.  The cardiac silhouette is not enlarged. No visualized pleural effusion  or pneumothorax. Lung apices are partially obscured by chin and  patient positioning. 11 mm nodule in the anterior left upper lobe.  Additional 4 mm nodule in the lateral right lower lung. Probable small  calcified mediastinal lymph nodes. Visualized upper abdomen is  unremarkable.                                                                      IMPRESSION: 11 mm nodularity in the anterior left upper lobe and 4 mm  nodule in the lateral right lower lung, probably sequela of prior  granulomatous disease, though recommend further evaluation with chest  CT.    I reviewed the chest x-ray reports from 2016, 2019, and the lung window reports from an abdominal CT scan in 2020.  The abdominal CT scan describes a right lower lobe calcified nodule, which could be the right lower lobe nodule noted on the chest x-ray today.  There is no description of the left upper lobe nodule on previous imaging.    Chest CT: I have reviewed the images from 6/12/2025 and agree with the radiologist interpretation  below:  1. Nonspecific reticulation or mild fibrosis with upper lung predominance. No honeycombing.   2. Calcific pleural plaques bilaterally.   3. Scattered solid pulmonary micronodules measuring 3 mm or less.           No results found for this or any previous visit (from the past week).                                        Again, thank you for allowing me to participate in the care of your patient.        Sincerely,        Pa Guaman MD    Electronically signed

## 2025-07-21 NOTE — PROGRESS NOTES
[facility] LUN[facility] SCIENCE AND HE[facility] [address] [address]  Phone: [phone]  Fax: [phone]    Patient:  Mary Cristina, Date of birth 1955  Date of Visit:  07/21/2025  Referring Provider Pa Guaman      Assessment & Plan      Pulmonary Fibrosis  Pulmonary nodules  Calcified Pleural Plaques    Findings of reticular opacities mostly in the upper lobes were incidentally found on a chest CT scan that she got for more acute upper respiratory symptoms.  It is unclear how long these opacities have been present.  I do not have another chest CT scan to compare.  She had pulmonary function testing performed 2 years ago.  I will repeat those today to see if there has been any major changes.    We spent the clinic today today describing what pulmonary fibrosis is and what it looks like on a chest CT scan.  The fact that she has associated calcified pleural plaques suggest that asbestosis is the cause of her lung disease.  Interestingly, her brother has the same issue with fibrosis and calcified pleural plaques.  She has no known asbestos exposure that she can point to.    I have ordered a serologic workup for her interstitial lung disease.  She will get pulmonary function testing today, and then again in 6 months with a follow-up with me.  I let her know that she should get in touch with me if she develops any new respiratory symptoms, but now she is currently asymptomatic from a breathing perspective.    Medical Decision Making      I spent a total of 85 minutes on the day of the visit.   Time spent by me today doing chart review, history and exam, documentation and further activities per the note       Pa Guaman MD                    Today's visit note:     Chief Complaint: RECHECK (Return Pulmonary )      HISTORY OF PRESENT ILLNESS:    Mary Cristina is a 70 year old year old female who is being seen for abnormal chest CT  scan findings.    I had previously seen her back in .  She had pulmonary nodules noted on chest x-rays and an abdominal CT scan.  The nodules were small, and after reviewing previous x-ray images at that they were stable.  I had recommended a chest CT scan just to get a better view of her lung parenchyma and see if there are any other nodules present.  She had a lot of concerns about getting radiation for the chest CT scan and so she declined.  She was to follow-up as needed in the future.  About 3 months ago, she had back to back illnesses which sounds like viral respiratory illnesses where she was coughing up a lot of mucus on a regular basis.  The symptoms have since subsided, but she did get a chest CT scan in  to evaluate these symptoms and it was noted that she had upper lobe predominant peripheral reticular opacities and calcified pleural plaques.  She made an appointment with me to discuss these findings.    Since her last visit with me, in general she has not had significant shortness of breath or cough, with the exception of the last 3 months.    She does have chronic joint pains of her right elbow, bilateral hands, left knee and back.  She also has a new skin rash that she is getting evaluated by dermatology.  It is a small raised diffuse itchy rash, and she was prescribed some ointment for this.  She does not think it is getting that much better.    She denies any known asbestos exposure.  She is concerned about her lungs because she did have 1 brother that  from pulmonary fibrosis, and she has another brother who I treat currently for pulmonary fibrosis.         Past Medical and Surgical History:     Past Medical History:   Diagnosis Date    Depression     Gastroesophageal reflux disease without esophagitis     High cholesterol     Macular degeneration (senile) of retina     Osteopenia      No past surgical history on file.        Family History:     Family History   Problem Relation Age of  "Onset    Pulmonary fibrosis Brother               Social History:     Social History     Socioeconomic History    Marital status:      Spouse name: Not on file    Number of children: Not on file    Years of education: Not on file    Highest education level: Not on file   Occupational History    Not on file   Tobacco Use    Smoking status: Never    Smokeless tobacco: Never   Substance and Sexual Activity    Alcohol use: Never    Drug use: Never    Sexual activity: Not on file   Other Topics Concern    Not on file   Social History Narrative    Not on file     Social Drivers of Health     Financial Resource Strain: Not on file   Food Insecurity: Not on file   Transportation Needs: Not on file   Physical Activity: Not on file   Stress: Not on file   Social Connections: Not on file   Interpersonal Safety: Not on file   Housing Stability: Not on file            Medications:     Current Outpatient Medications   Medication Sig Dispense Refill    atorvastatin (LIPITOR) 10 MG tablet Take 10 mg by mouth daily.      escitalopram (LEXAPRO) 5 MG tablet Take 5 mg by mouth daily.      famotidine (PEPCID) 20 MG tablet Take 20-40 mg by mouth.      LANsoprazole (PREVACID) 15 MG DR capsule TAKE 1 CAPSULE BY MOUTH TWICE A DAY BEFORE MEALS      Multiple Vitamins-Minerals (SUPER THERA JAMIN M) TABS Take 1 tablet by mouth.       No current facility-administered medications for this visit.            Review of Systems:       A complete review of systems was otherwise negative except as noted in the HPI.      PHYSICAL EXAM:  /68   Pulse 61   Ht 1.626 m (5' 4\")   Wt 74.4 kg (164 lb)   SpO2 98%   BMI 28.15 kg/m       General: Comfortable, No apparent distress  Eyes: Anicteric  Ears: Hearing grossly normal  Mouth: Oral mucosa is moist, without any lesions. No oropharyngeal exudate.  Lymphatics: No cervical or supraclavicular nodes  Respiratory: Good air movement. No crackles. No rhonchi. No wheezes  Cardiac: RRR, normal S1, " S2. No murmurs.   Musculoskeletal:  No clubbing. No cyanosis.  Lymphedema present bilateral ankles.  Skin: No rash on limited exam  Neuro: Normal mentation. Normal speech.  Psych:Normal affect           Data:   All laboratory and imaging data reviewed.      PFT:         PFT Interpretation:  No airflow obstruction.  Normal lung volume. Normal diffusion capacity  Valid Maneuver    CXR: I have reviewed the CXR images from April 24, 2023 and agree with the radiologist interpretation below:  FINDINGS: PA and lateral radiographs of the chest. Trachea is midline.  The cardiac silhouette is not enlarged. No visualized pleural effusion  or pneumothorax. Lung apices are partially obscured by chin and  patient positioning. 11 mm nodule in the anterior left upper lobe.  Additional 4 mm nodule in the lateral right lower lung. Probable small  calcified mediastinal lymph nodes. Visualized upper abdomen is  unremarkable.                                                                      IMPRESSION: 11 mm nodularity in the anterior left upper lobe and 4 mm  nodule in the lateral right lower lung, probably sequela of prior  granulomatous disease, though recommend further evaluation with chest  CT.    I reviewed the chest x-ray reports from 2016, 2019, and the lung window reports from an abdominal CT scan in 2020.  The abdominal CT scan describes a right lower lobe calcified nodule, which could be the right lower lobe nodule noted on the chest x-ray today.  There is no description of the left upper lobe nodule on previous imaging.    Chest CT: I have reviewed the images from 6/12/2025 and agree with the radiologist interpretation below:  1. Nonspecific reticulation or mild fibrosis with upper lung predominance. No honeycombing.   2. Calcific pleural plaques bilaterally.   3. Scattered solid pulmonary micronodules measuring 3 mm or less.           No results found for this or any previous visit (from the past week).

## 2025-07-21 NOTE — NURSING NOTE
Chief Complaint   Patient presents with    RECHECK     Return Pulmonary     Medications reviewed and vital signs taken.   Brady Mcgee, TC

## 2025-07-22 LAB
ALBUMIN SERPL BCG-MCNC: 4.3 G/DL (ref 3.5–5.2)
ALP SERPL-CCNC: 108 U/L (ref 40–150)
ALT SERPL W P-5'-P-CCNC: 15 U/L (ref 0–50)
ANA SER QL IF: NEGATIVE
ANCA AB PATTERN SER IF-IMP: NORMAL
ANION GAP SERPL CALCULATED.3IONS-SCNC: 11 MMOL/L (ref 7–15)
AST SERPL W P-5'-P-CCNC: 19 U/L (ref 0–45)
BILIRUB SERPL-MCNC: 0.3 MG/DL
BUN SERPL-MCNC: 21.3 MG/DL (ref 8–23)
C-ANCA TITR SER IF: NORMAL {TITER}
CALCIUM SERPL-MCNC: 9.5 MG/DL (ref 8.8–10.4)
CHLORIDE SERPL-SCNC: 102 MMOL/L (ref 98–107)
CREAT SERPL-MCNC: 0.84 MG/DL (ref 0.51–0.95)
CRP SERPL-MCNC: <3 MG/L
EGFRCR SERPLBLD CKD-EPI 2021: 74 ML/MIN/1.73M2
GLUCOSE SERPL-MCNC: 85 MG/DL (ref 70–99)
HCO3 SERPL-SCNC: 26 MMOL/L (ref 22–29)
POTASSIUM SERPL-SCNC: 4.2 MMOL/L (ref 3.4–5.3)
PROT SERPL-MCNC: 6.8 G/DL (ref 6.4–8.3)
RHEUMATOID FACT SERPL-ACNC: <10 IU/ML
SODIUM SERPL-SCNC: 139 MMOL/L (ref 135–145)

## 2025-07-23 LAB
CCP AB SER IA-ACNC: 0.8 U/ML
ENA SCL70 IGG SER IA-ACNC: <0.6 U/ML
ENA SCL70 IGG SER IA-ACNC: NEGATIVE
ENA SS-A AB SER IA-ACNC: <0.5 U/ML
ENA SS-A AB SER IA-ACNC: NEGATIVE
ENA SS-B IGG SER IA-ACNC: <0.6 U/ML
ENA SS-B IGG SER IA-ACNC: NEGATIVE

## 2025-07-26 LAB
A FLAVUS AB SER QL ID: NORMAL
A FUMIGATUS1 AB SER QL ID: NORMAL
A FUMIGATUS2 AB SER QL ID: NORMAL
A FUMIGATUS3 AB SER QL ID: NORMAL
A FUMIGATUS6 AB SER QL ID: NORMAL
A PULLULANS AB SER QL ID: NORMAL
PIGEON SERUM AB QL ID: NORMAL
S RECTIVIRGULA AB SER QL ID: NORMAL
S VIRIDIS AB SER QL ID: NORMAL
T CANDIDUS AB SER QL: NORMAL

## 2025-07-27 ENCOUNTER — HEALTH MAINTENANCE LETTER (OUTPATIENT)
Age: 70
End: 2025-07-27